# Patient Record
Sex: FEMALE | URBAN - METROPOLITAN AREA
[De-identification: names, ages, dates, MRNs, and addresses within clinical notes are randomized per-mention and may not be internally consistent; named-entity substitution may affect disease eponyms.]

---

## 2021-08-24 ENCOUNTER — HOSPITAL ENCOUNTER (EMERGENCY)
Age: 51
Discharge: ANOTHER HOSPITAL | End: 2021-08-24
Payer: COMMERCIAL

## 2021-08-24 ENCOUNTER — APPOINTMENT (OUTPATIENT)
Dept: CT IMAGING | Age: 51
End: 2021-08-24

## 2021-08-24 ENCOUNTER — HOSPITAL ENCOUNTER (OUTPATIENT)
Age: 51
Setting detail: SPECIMEN
Discharge: HOME | End: 2021-08-24

## 2021-08-24 PROCEDURE — 82565 ASSAY OF CREATININE: CPT

## 2021-08-24 PROCEDURE — 70450 CT HEAD/BRAIN W/O DYE: CPT

## 2021-08-24 PROCEDURE — 2580000003 HC RX 258: Performed by: STUDENT IN AN ORGANIZED HEALTH CARE EDUCATION/TRAINING PROGRAM

## 2021-08-24 PROCEDURE — 99203 OFFICE O/P NEW LOW 30 MIN: CPT | Performed by: PSYCHIATRY & NEUROLOGY

## 2021-08-24 RX ORDER — 0.9 % SODIUM CHLORIDE 0.9 %
INTRAVENOUS SOLUTION INTRAVENOUS CONTINUOUS PRN
Status: COMPLETED | OUTPATIENT
Start: 2021-08-24 | End: 2021-08-24

## 2021-08-24 RX ADMIN — SODIUM CHLORIDE 500 ML: 0.9 INJECTION, SOLUTION INTRAVENOUS at 14:35

## 2021-08-24 NOTE — ED NOTES
..    8/24/2021 3:54 PM    Patient: Nilo Mckinney, 46 y.o., female Race: Caucasion  Patient Address: No address on file. Incident Address:  [place] [address] [address]     [x] [facility]-  [] [facility]  [] [place]   [] [place]  [] [facility]  Patient Phone #: There are no phone numbers on file. Insurance: Payor: /   Worker's Comp:  []  Yes   [x]  No  Emergency Contact: No emergency contact information on file. MRN: [id number]  [facility]# [id number]  YOB: 1970  Primary Care Physician: No primary care provider on file. Advance Directive: [x] Full Code   []DNR-CC   []DNR-CCA    MSU Crew: BAYRON Pinto - CT Tech, GERALDINE Hernandez - Paramedic, Xiang Montalvo - RN    Pt Transported To:  [] [place] [address]  [] [facility]  [] [facility]  [] [facility]  [x] [facility]  [] [facility]  [facility] [] [facility] [] [facility]    Was patient transported to closest facility? []Yes  [x]No (if No specify reason)  Closest facility on [address]   []   Patient/family request    []   Divert to specialist       Response Code   [] 2  [x] 3  []   Change  [] 2  [] 3   Transport Code   [x] 2  [] 3  []   Change  [] 2  [] 3     Mileage:  [address] [facility]   Total Miles 6.9     Call Received 8/24/2021 1338   Dispatched 8/24/2021 1340   Enroute 8/24/2021 1344   Arrived Scene 8/24/2021 1358   At Patient 8/24/2021 1401   Decision to Scan 8/24/2021 1405   Scan 8/24/2021 1412   Departed Scene 8/24/2021 [facility] 8/24/2021 [address] 8/24/2021 1510   In Service 8/24/2021 1510         Allergies  [] Updated/Reviewed by MSU  [x] Historical Data Only  [] Unavailable  has no allergies on file. Medications  [] Updated/Reviewed by MSU  [x] Historical Data Only  [] Unavailable  Prior to Admission medications    Not on File      Past Medical History  [] Updated/Reviewed by MSU  [x] Historical Data Only  [] Unavailable   has no past medical history on file.     Patient Weight: 145 lbs   [x]   Estimated   []   Stated          VITALS Time BP Pulse   Resp  Rate N-normal  S-shallow  D-deep Monitor SpO2 O2    LPM BGL   Temp Pain   1410 127/82 102 28 N ST 99%      1425 131/86 109 21 N ST 94% RA      1445 113/76 104 19 N ST 96% RA                                   Pupils GCS   Time R L E  4 V  5 M  6 T  15   1410 3 3 4 5 6 15   1425 3 3 4 5 6 15   1445 3 3 4 5 6 15                         [facility] -   record on all transports and Q15 min after tPA administration    NIHSS       Time 1405      1a. LOC - Arousal Status 0      1b. LOC - Questions 0      1c. LOC - Commands 0      2. Eye Movements (gaze) 0      3. Visual Fields 0      4. Facial Palsy 0      5a. Motor Left Arm 0      5b. Motor Right Arm 0      6a. Motor Left Leg 0      6b. Motor Right Leg 0      7. Limb Ataxia 0      8. Sensory 1      9. Language/Aphasia 0      10. Dysarthria 1      11. Neglect 0      TOTAL 2          Research:    RACE Score: Zero Time: 4495  StrokeVAN Score: Neg Time: 5113    Time Last Known Well:  8859 8/24/2021    Narrative:    Dispatched to possible CVA per EMS. Arrived to find Osmany Guerrero, 46 y.o., female c/o right sided numbness and tingling, incomprehensible speech and shaking after chemo. Report received from [address] [place] [address] and nursing staff at patients side. Pt is being treated for sigmoid color cancer and was at the [facility] today receiving her 2nd dose of chemotherapy medication Oxaliplatin. After her treatment was complete and was leaving the center she reports feeling dizzy with right sided severe numbness and tingling and her right hand cramping up. Per the [place] center nurse, pt's speech was slurred and incomprehensible but quickly returned to normal. EMS was called and MSU notifed. Upon MSU arrival, pt found sitting in treatment area with IV port accessed and IV fluid infusing. Pt noted to be awake alert and oriented. Pt states she feels like she has pins and needles to her entire right side.  Her voice is shaky and her body is trembling. No facial droop noted, no arm drift noted, no left drift noted. No gaze preference. Pt denies HA, blurry vision,nausea or vomiting. Decision made to scan. Upon standing pt reports severe pricking pain to right leg and arm when being moved. Pt had difficulty walking self to cot and required 2 assist. After CT scan pt's symptoms began to improve. Pt more relaxed and body trembling ceased. Pt reports numbness and tingling is improving. Dr. Liudmila Mistry at patient side via telemedicine unit. Reviewed tPA administration and its uses with risks and benefits with pt. At this time pt feels she does not want tPA for symptoms. Dr Liudmila Mistry agreeable with decision. Pt being transported to [facility] due to [facility] on EMS bypass. Patient received the following medications prior to MSU arrival: NONE  Patient has the following lines prior to MSU arrival: Gripper needle to port-right chest  Patient has the following airway placed prior to MSU arrival: NONE    Patient was transferred to cot via 2 person assist and secured with straps x3. Zoll ECG, SpO2, and NIBP applied and monitored throughout encounter. HOB maintained at 30 degrees. Patient was transferred to ambulance, cot secured in scan position. Non contrast CT scan performed. After scan cot secured in transport position. IV tPA inclusion/exclusion criteria reviewed with patient and physician. Candidate for IV tPA therapy? [] Yes   [x] No - due to the following exclusion criteria:    [] ICH   [] Taking anticoagulant -   [] Beyond last time known well window  [] At or returned to baseline   [x] Marked improvement of symptoms  [x] No disabling symptoms  [] Other -     Patient is being transported to [facility] .   During transport patient rests comfortably. Cabin temp maintained at 70-72 °F throughout transport. Patient was transferred to bed 29 via 4 person sheet lift. Patient care handoff completed with RN at bedside.  CT findings verbally relayed TISSUES/SKULL:  No acute abnormality of the visualized skull or soft tissues. 1. Apparent linear hyperattenuation along the left sylvian fissure and left circular sulcus within the location of the left distal M1/proximal M2 and superior division M2 branchs, respectively. Findings raise the possibility of underlying intraluminal thrombus. No prior head CTs were available at the time of dictation for comparison. 2. In addition, there is questionable blurring of the left insular cortex and left temporal operculum. The above findings along with patient's symptomatology raises the concern for left MCA infarct. 3. No evidence of acute parenchymal hemorrhage or significant mass effect. 4. Mucosal lesion within the left maxillary sinus with differential considerations including an antrochoanal polyp, mucocele or inverted papilloma. Critical results were called by Dr. Mabelene Fleischer, DO to Dr. Polly Chicas on 8/24/2021 at 14:35. Physical assessment performed:    Neuro: Rt sided face arm and leg numbness and tingling, thick speech- resolved, body trembles-resolved,    Resp: lungs clear to auscultation bilaterally, normal effort  Cardiac: regular rate, no murmur, 12 lead ECG shows NSR  Muscular/skeletal/peripheral vascular: no edema, no redness or tenderness in the calves, pulses present in 4 extremities   Abd/GI/: abd flat, soft, non tender   Skin: normal color, warm, dry      IV LINES   Time Size Site # Attempts Performed By   1430 18g RT AC 1 GERALDINE Hernandez EMT-P                     Total Amount of IV Fluids Infused: 100ml    MEDS / ELECTRICAL RX   Time Therapy Dosage Route Response Performed By                                                       TPA Administration    Time Bolus Dose Infusion Dose Waste   NA      NA          [] tPA dosing double checked by:         ACUTE STROKE DYSPHAGIA SCREEN              YES NO   1 GCS less than 13?         2 Facial Asymmetry or Weakness? 3 Tongue Asymmetry or Weakness?        4 Palatal Asymmetry or Weakness? 5 Signs of aspiration during 3oz water test?*                 If answers to questions 1-4 are NO proceed to 3oz water test               *Administer 3oz of water for sequential drinks, note any throat clearing, cough, or change in vocal quality immediately after and 1 minute following the swallow.                If answers to questions 1-5 are NO proceed with ordered PO meds       POC LABS      TIME 1429     Test (reference range)      FSBG ()      PT (11-13.5)      INR (0.8-1.1)      Na (138-146)      K (3.5-4.9)      Cl ()      iCa (1.2-1.32)      TCO2 (24-29)      Glu ()      BUN (8.0-26)      Crea (0.6-1.3) 0.7     Hct (38-51)      Hb (12.0-17)      AnGap 10.0-20)                Assistance:   []    Fire -     []    Police    [x]    Other EMS (See Below)    LS # 5      Hospital Notification:    Med [facility] -  [] [facility]  [] [facility]  [x] [facility]  [] [facility]  [] [facility] [] [facility] [] [facility] [] [facility]  [] [employer]     [x]    Med Channel:     []    Cell Phone     Med Control Orders Received:   [x] No  []  Yes:     Med Control Physician (if on line medical direction received)  -        Hospital Team Alert:   []    Trauma Alert    []    STEMI Alert         []    Stroke Alert    []    RACE Alert      Description Of Valuables: none    Patient Valuables:   []    With Patient    []    Not Received    []    ER / Lab     Call Outcome:   [x]    Transport to Facility    []    Care Transferred to [facility]    []    Cancelled    []    Patient Refusal    []                           Mobile Stroke Unit    Electronically signed by Dena Tuttle RN on 8/24/21 at 3:54 PM EDT     Dena Tuttle RN  08/24/21 5985

## 2021-08-24 NOTE — VIRTUAL HEALTH
[address] [facility],4Th Floor Stroke and Vascular Neurology Consult for  [facility] Stroke Unit Stroke Alert through [address] @ 215pm 8/24/2021 8/24/2021 5:59 PM    Pt Name: Rm Silva  MRN: [id number]  YOB: 1970  Date of evaluation: 8/24/2021  Primary Care Physician: No primary care provider on file. Reason for Evaluation: Stroke Evaluation with Discussion with Ed or primary team with Telemedicine and stroke evaluation with Review of imaging and labs    54yo female with limited pmh including current dx of colon cancer, on chemotherapy. Pt was given her second dose of oxiplatin, and had R koko numbness and paresthesias, dizziness, speech difficulty. At baseline pt has no focal deficits. lkn 1pm 8/24/2021. Pt had her second dose of oxiplatin chemotherapy earlier today. Pt had acute R koko numbness and paresthesias, dizziness, speech difficulty. On EMS arrival pt's speech is back to normal, some residual r koko numbness and paresthesia. sbp 129, glu 113. Allergies  has no allergies on file. Medications  Prior to Admission medications    Not on File    Scheduled Meds:  Continuous Infusions:  PRN Meds:.  Past Medical History   has no past medical history on file.   Social History  Social History     Socioeconomic History    Marital status: Not on file     Spouse name: Not on file    Number of children: Not on file    Years of education: Not on file    Highest education level: Not on file   Occupational History    Not on file   Tobacco Use    Smoking status: Not on file   Substance and Sexual Activity    Alcohol use: Not on file    Drug use: Not on file    Sexual activity: Not on file   Other Topics Concern    Not on file   Social History Narrative    Not on file     Social Determinants of Health     Financial Resource Strain:     Difficulty of Paying Living Expenses:    Food Insecurity:     Worried About Running Out of Food in the Last Year:     [address] in the Last Year:    Transportation Needs:     Lack of Transportation (Medical):  Lack of Transportation (Non-Medical):    Physical Activity:     Days of Exercise per Week:     Minutes of Exercise per Session:    Stress:     Feeling of Stress :    Social Connections:     Frequency of Communication with Friends and Family:     Frequency of Social Gatherings with Friends and Family:     Attends Latter day Services:     Active Member of Clubs or Organizations:     Attends Club or Organization Meetings:     Marital Status:    Intimate Partner Violence:     Fear of Current or Ex-Partner:     Emotionally Abused:     Physically Abused:     Sexually Abused:      Family History  No family history on file. OBJECTIVE  There were no vitals filed for this visit. General:  Gen: normal habitus, NAD  HEENT: NCAT, mucosa moist  Cvs: RRR, S1 S2 normal  Resp: symmetric unlabored breathing  Abd: s/nd/nt  Ext: no edema  Skin: no lesions seen, warm and dry    Neuro:  Gen: awake and alert, oriented x3. Lang/speech: no aphasia or dysarthria. Follows commands. CN: PERRL, EOMI, VFF, V1-3 intact, face symmetric, hearing intact, shoulder shrug symmetric, tongue midline  Motor: grossly 5/5 UE and LE b/l  Sense: LT R hemibody with paresthesia. Coord: FTN and HTS intact b/l  DTR: deferred  Gait: deferred    NIH Stroke Scale:   1a  Level of consciousness: 0 - alert; keenly responsive   1b. LOC questions:  0 - answers both questions correctly   1c. LOC commands: 0 - performs both tasks correctly   2. Best Gaze: 0 - normal   3. Visual: 0 - no visual loss   4. Facial Palsy: 0 - normal symmetric movement   5a. Motor left arm: 0 - no drift, limb holds 90 (or 45) degrees for full 10 seconds   5b. Motor right arm: 0 - no drift, limb holds 90 (or 45) degrees for full 10 seconds   6a.  Motor left le - no drift; leg holds 30 degree position for full 5 seconds   6b  Motor right le - no drift; leg holds 30 degree position for full 5 seconds   7. Limb Ataxia: 0 - absent   8. Sensory: 1 - mild to moderate sensory loss; patient feels pinprick is less sharp or is dull on the affected side; there is a loss of superficial pain with pinprick but patient is aware of being touched    9. Best Language:  0 - no aphasia, normal   10. Dysarthria: 0 - normal   11. Extinction and Inattention: 0 - no abnormality         Total:   1     Premorbid MRS: 0      Imaging:  Images were personally reviewed with ANA CRISTINA. AI used to review images including:  CT brain without contrast: no large territory hypodensity or bleed    Assessment  54yo female with limited pmh including current dx of colon cancer, on chemotherapy. Pt was given her second dose of oxiplatin, and had R koko numbness and paresthesias, dizziness, speech difficulty. Symptoms rapidly improving. Ct head no bleed or large hypodensity. Per radiology there is possible L insular and opercular grey/white junction blurring and L MCA hyperdense. ddx includes acute stroke, side effect from the medication, focal sz. Recommendations:  --tpa offered but declined. Mild symptoms. --transfer to [facility] ED. On arrival stat cta head and neck w and wo con. If there is lvo consider mt. --bolus 1L ns ivf then continue 100cc/hr  --mri brain w and wo con, additional care and workup as per neuro. Discussed with Stroke team    At least 30 min of critical care time spent through telemedicine robot at patient bedside (via interactive/real-time software) as patient is in imminent and life threatening deterioration with further treatment and evaluation. This Virtual Visit was conducted with patient's (and/or legal guardian's) consent, to provide telestroke consultation and necessary medical care.   Time spent examining patient, reviewing the images personally, reviewing the chart, perform high complexity decision making and speaking with the nursing staff regarding recommendations      Dawood Croft MD, PhD  Stroke, Neurocritical Care And/or [address] [facility]  [address] [place]  Electronically signed 8/24/2021 at 5:59 PM

## 2021-08-25 LAB — POC CREATININE: 0.7 MG/DL (ref 0.6–1.4)

## 2022-02-24 NOTE — VIRTUAL HEALTH
[address] [facility],4Th Floor Stroke and Vascular Neurology Consult for  [facility] Stroke Unit Stroke Alert through [address] @ 215pm 8/24/2021 2/24/2022 12:36 PM    Pt Name: Wilils Mcnamara  MRN: [id number]  [place]: 1970  Date of evaluation: 2/24/2022  Primary Care Physician: Mu Medina March, DO  Reason for Evaluation: Stroke Evaluation with Discussion with Ed or primary team with Telemedicine and stroke evaluation with Review of imaging and labs    52yo female with limited pmh including current dx of colon cancer, on chemotherapy. Pt was given her second dose of oxiplatin, and had R koko numbness and paresthesias, dizziness, speech difficulty. At baseline pt has no focal deficits. lkn 1pm 8/24/2021. Pt had her second dose of oxiplatin chemotherapy earlier today. Pt had acute R koko numbness and paresthesias, dizziness, speech difficulty. On EMS arrival pt's speech is back to normal, some residual r koko numbness and paresthesia. sbp 129, glu 113. Allergies  has no allergies on file. Medications  Prior to Admission medications    Not on File    Scheduled Meds:  Continuous Infusions:  PRN Meds:.  Past Medical History   has no past medical history on file.   Social History  Social History     Socioeconomic History    Marital status: Unknown     Spouse name: Not on file    Number of children: Not on file    Years of education: Not on file    Highest education level: Not on file   Occupational History    Not on file   Tobacco Use    Smoking status: Not on file    Smokeless tobacco: Not on file   Substance and Sexual Activity    Alcohol use: Not on file    Drug use: Not on file    Sexual activity: Not on file   Other Topics Concern    Not on file   Social History Narrative    Not on file     Social Determinants of Health     Financial Resource Strain:     Difficulty of Paying Living Expenses: Not on file   Food Insecurity:     Worried About [address] in the Last Year: Not on file    Ran Out of Food in the Last Year: Not on file   Transportation Needs:     Lack of Transportation (Medical): Not on file    Lack of Transportation (Non-Medical): Not on file   Physical Activity:     Days of Exercise per Week: Not on file    Minutes of Exercise per Session: Not on file   Stress:     Feeling of Stress : Not on file   Social Connections:     Frequency of Communication with Friends and Family: Not on file    Frequency of Social Gatherings with Friends and Family: Not on file    Attends [employer] Services: Not on file    Active Member of [address] [place] [address] [employer] or Organizations: Not on file    Attends Club or Organization Meetings: Not on file    Marital Status: Not on file   Intimate Partner Violence:     Fear of Current or Ex-Partner: Not on file    Emotionally Abused: Not on file    Physically Abused: Not on file    Sexually Abused: Not on file   Housing Stability:     Unable to Pay for Housing in the Last Year: Not on file    Number of [place] in the Last Year: Not on file    Unstable Housing in the Last Year: Not on file     Family History  No family history on file. OBJECTIVE  There were no vitals filed for this visit. General:  Gen: normal habitus, NAD  HEENT: NCAT, mucosa moist  Cvs: RRR, S1 S2 normal  Resp: symmetric unlabored breathing  Abd: s/nd/nt  Ext: no edema  Skin: no lesions seen, warm and dry    Neuro:  Gen: awake and alert, oriented x3. Lang/speech: no aphasia or dysarthria. Follows commands. CN: PERRL, EOMI, VFF, V1-3 intact, face symmetric, hearing intact, shoulder shrug symmetric, tongue midline  Motor: grossly 5/5 UE and LE b/l  Sense: LT R hemibody with paresthesia. Coord: FTN and HTS intact b/l  DTR: deferred  Gait: deferred    NIH Stroke Scale:   1a  Level of consciousness: 0 - alert; keenly responsive   1b. LOC questions:  0 - answers both questions correctly   1c. LOC commands: 0 - performs both tasks correctly   2.   Best Gaze: 0 - normal 3. Visual: 0 - no visual loss   4. Facial Palsy: 0 - normal symmetric movement   5a. Motor left arm: 0 - no drift, limb holds 90 (or 45) degrees for full 10 seconds   5b. Motor right arm: 0 - no drift, limb holds 90 (or 45) degrees for full 10 seconds   6a. Motor left le - no drift; leg holds 30 degree position for full 5 seconds   6b  Motor right le - no drift; leg holds 30 degree position for full 5 seconds   7. Limb Ataxia: 0 - absent   8. Sensory: 1 - mild to moderate sensory loss; patient feels pinprick is less sharp or is dull on the affected side; there is a loss of superficial pain with pinprick but patient is aware of being touched    9. Best Language:  0 - no aphasia, normal   10. Dysarthria: 0 - normal   11. Extinction and Inattention: 0 - no abnormality         Total:   1     Premorbid MRS: 0      Imaging:  Images were personally reviewed with ANA CRISTINA. AI used to review images including:  CT brain without contrast: no large territory hypodensity or bleed    Assessment  54yo female with limited pmh including current dx of colon cancer, on chemotherapy. Pt was given her second dose of oxiplatin, and had R koko numbness and paresthesias, dizziness, speech difficulty. Symptoms rapidly improving. Ct head no bleed or large hypodensity. Per radiology there is possible L insular and opercular grey/white junction blurring and L MCA hyperdense. ddx includes acute stroke, side effect from the medication, focal sz. Recommendations:  --tpa offered but declined. Mild symptoms. --transfer to [facility] ED. On arrival stat cta head and neck w and wo con. If there is lvo consider mt. --bolus 1L ns ivf then continue 100cc/hr  --mri brain w and wo con, additional care and workup as per neuro.     Discussed with Stroke team    At least 30 min of critical care time spent through telemedicine robot at patient bedside (via interactive/real-time software) as patient is in imminent and life threatening deterioration with further treatment and evaluation. This Virtual Visit was conducted with patient's (and/or legal guardian's) consent, to provide telestroke consultation and necessary medical care.   Time spent examining patient, reviewing the images personally, reviewing the chart, perform high complexity decision making and speaking with the nursing staff regarding recommendations      Luz Marina Resendez MD, PhD  Stroke, Neurocritical Care And/or [address] [place] [address] Stroke [address] [place]  Electronically signed 2/24/2022 at 12:36 PM

## 2022-02-24 NOTE — ED NOTES
..    2/24/2022 12:36 PM    Patient: Christiano Mahoney, 46 y.o., female Race: [facility]  Patient Address: [address] [place] [address]  Incident Address:  [place] [address] [address]     [x] [facility] PASSMadison-Gilbert-  [] [facility]  [] [facility]   [] KATE AG JR. [facility]  [] [facility]  Patient Phone #: [phone]   Insurance: Payor: Flynn King /  /  /   Rob Ulices:  []  Yes   [x]  No  Emergency Contact: Extended Emergency Contact Information  Primary Emergency ContactIgor Hull  Mobile Phone: [phone]  Relation: Spouse  MRN: [id number]  [facility]# [id number]  YOB: 1970  Primary Care Physician: Maria Elena Mckeon March, DO  Advance Directive: [x] Full Code   []DNR-CC   []DNR-CCA    MSU Crew: BAYRON Culver - CT TechGERALDINE - Paramedic, Denver Rucks - TAMELA    Pt Transported To:  [] [facility]  [] [facility]  [] [facility]  [] [facility]  [x] [facility]  [] [facility]  [facility] [] [facility] [] [facility]    Was patient transported to closest facility? []Yes  [x]No (if No specify reason)  Closest facility on [address]   []   Patient/family request    []   Divert to specialist       Response Code   [] 2  [x] 3  []   Change  [] 2  [] 3   Transport Code   [x] 2  [] 3  []   Change  [] 2  [] 3     Mileage:  [address] [facility]   Total Miles 6.9     Call Received 2/24/2022 1338   Dispatched 2/24/2022 1340   Enroute 2/24/2022 1344   Arrived Scene 2/24/2022 1358   At Patient 2/24/2022 1401   Decision to Scan 2/24/2022 1405   Scan 2/24/2022 1412   Departed Scene 2/24/2022 [address] [facility] 2/24/2022 [address] 2/24/2022 1510   In Service 2/24/2022 1510         Allergies  [] Updated/Reviewed by MSU  [x] Historical Data Only  [] Unavailable  has no allergies on file.   Medications  [] Updated/Reviewed by MSU  [x] Historical Data Only  [] Unavailable  Prior to Admission medications    Not on File      Past Medical History  [] Updated/Reviewed by MSU  [x] Historical Data Only  [] Unavailable   has no past medical history on file.    Patient Weight: 145 lbs   [x]   Estimated   []   Stated          VITALS          Time BP Pulse   Resp  Rate N-normal  S-shallow  D-deep Monitor SpO2 O2    LPM BGL   Temp Pain   1410 127/82 102 28 N ST 99%      1425 131/86 109 21 N ST 94% RA      1445 113/76 104 19 N ST 96% RA                                   Pupils GCS   Time R L E  4 V  5 M  6 T  15   1410 3 3 4 5 6 15   1425 3 3 4 5 6 15   1445 3 3 4 5 6 15                         [facility] -   record on all transports and Q15 min after tPA administration    NIHSS       Time 1405      1a. LOC - Arousal Status 0      1b. LOC - Questions 0      1c. LOC - Commands 0      2. Eye Movements (gaze) 0      3. Visual Fields 0      4. Facial Palsy 0      5a. Motor Left Arm 0      5b. Motor Right Arm 0      6a. Motor Left Leg 0      6b. Motor Right Leg 0      7. Limb Ataxia 0      8. Sensory 1      9. Language/Aphasia 0      10. Dysarthria 1      11. Neglect 0      TOTAL 2          Research:    RACE Score: Zero Time: 0064  StrokeVAN Score: Neg Time: 6327    Time Last Known Well:  9425 8/24/2021    Narrative:    Dispatched to possible [facility] per [facility]. Arrived to find Ann-Marie Nelson, 46 y.o., female c/o right sided numbness and tingling, incomprehensible speech and shaking after chemo. Report received from [address] [place] [address] and nursing staff at patients side. Pt is being treated for sigmoid color cancer and was at the [facility] today receiving her 2nd dose of chemotherapy medication Oxaliplatin. After her treatment was complete and was leaving the center she reports feeling dizzy with right sided severe numbness and tingling and her right hand cramping up. Per the [facility] center nurse, pt's speech was slurred and incomprehensible but quickly returned to normal. EMS was called and MSU notifed. Upon MSU arrival, pt found sitting in treatment area with IV port accessed and IV fluid infusing. Pt noted to be awake alert and oriented.  Pt states she feels like she has pins and needles to her entire right side. Her voice is shaky and her body is trembling. No facial droop noted, no arm drift noted, no left drift noted. No gaze preference. Pt denies HA, blurry vision,nausea or vomiting. Decision made to scan. Upon standing pt reports severe pricking pain to right leg and arm when being moved. Pt had difficulty walking self to cot and required 2 assist. After CT scan pt's symptoms began to improve. Pt more relaxed and body trembling ceased. Pt reports numbness and tingling is improving. Dr. Niko Luna at patient side via telemedicine unit. Reviewed tPA administration and its uses with risks and benefits with pt. At this time pt feels she does not want tPA for symptoms. Dr Niko Luna agreeable with decision. Pt being transported to [facility] due to [facility] on EMS bypass. Patient received the following medications prior to MSU arrival: NONE  Patient has the following lines prior to MSU arrival: Gripper needle to port-right chest  Patient has the following airway placed prior to MSU arrival: NONE    Patient was transferred to cot via 2 person assist and secured with straps x3. Zoll ECG, SpO2, and NIBP applied and monitored throughout encounter. HOB maintained at 30 degrees. Patient was transferred to ambulance, cot secured in scan position. Non contrast CT scan performed. After scan cot secured in transport position. IV tPA inclusion/exclusion criteria reviewed with patient and physician. Candidate for IV tPA therapy? [] Yes   [x] No - due to the following exclusion criteria:    [] ICH   [] Taking anticoagulant -   [] Beyond last time known well window  [] At or returned to baseline   [x] Marked improvement of symptoms  [x] No disabling symptoms  [] Other -     Patient is being transported to [facility] .   During transport patient rests comfortably. Cabin temp maintained at 70-72 °F throughout transport.      Patient was transferred to bed 29 via 4 person sheet lift. Patient care handoff completed with RN at bedside. CT findings verbally relayed to RN. CT disc taken to radiology room by Trista Radford. Imaging:  Images were obtained onboard the MSU including:  [x] CT brain without contrast - see results below:      CT HEAD WO CONTRAST    Result Date: 8/24/2021  EXAMINATION: CT OF THE HEAD WITHOUT CONTRAST  8/24/2021 2:08 pm TECHNIQUE: CT of the head was performed without the administration of intravenous contrast. Dose modulation, iterative reconstruction, and/or weight based adjustment of the mA/kV was utilized to reduce the radiation dose to as low as reasonably achievable. COMPARISON: None. HISTORY: ORDERING SYSTEM PROVIDED HISTORY: Stroke TECHNOLOGIST PROVIDED HISTORY: Stroke Decision Support Exception - unselect if not a suspected or confirmed emergency medical condition->Emergency Medical Condition (MA) Is the patient pregnant?->No FINDINGS: BRAIN/VENTRICLES: Questionable blurring of the left insular cortex and left temporal operculum, although the exam is slightly degraded due to beam hardening artifact and portable technique. There is also apparent linear hyperattenuation along the left sylvian fissure within the distribution of the distal M1/proximal M2 segment raising the possibility of underlying intraluminal thrombus with additional punctate hyperdensity along the left circular sulcus in the location of the superior division left M2 branch. There is no acute intracranial hemorrhage, mass effect or midline shift. No abnormal extra-axial fluid collection. There is no evidence of hydrocephalus. ORBITS: The visualized portion of the orbits demonstrate no acute abnormality. SINUSES: There is complete opacification of the left maxillary sinus with bulging of the right maxillary sinus wall with protrusion into the left nasal cavity. No adjacent aggressive osseous changes. Preserved left retro maxillary fat pad.   Findings raise the possibility of underlying mucosal Facial Asymmetry or Weakness? 3 Tongue Asymmetry or Weakness? 4 Palatal Asymmetry or Weakness? 5 Signs of aspiration during 3oz water test?*                 If answers to questions 1-4 are NO proceed to 3oz water test               *Administer 3oz of water for sequential drinks, note any throat clearing, cough, or change in vocal quality immediately after and 1 minute following the swallow.                If answers to questions 1-5 are NO proceed with ordered PO meds       POC LABS      TIME 1429     Test (reference range)      FSBG ()      PT (11-13.5)      INR (0.8-1.1)      Na (138-146)      K (3.5-4.9)      Cl ()      iCa (1.2-1.32)      TCO2 (24-29)      Glu ()      BUN (8.0-26)      Crea (0.6-1.3) 0.7     Hct (38-51)      Hb (12.0-17)      AnGap 10.0-20)                Assistance:   []    Fire -     []    Police    [x]    Other EMS (See Below)    LS # 5      Hospital Notification:    [address] -  [] Ab[facility] Mcbride [address]  [] [facility]  [x] [facility]  [] [facility]  [] [facility] [] [facility] [] [facility] [] [facility]  [] [facility]     [x]    Med Channel:     []    Cell Phone     Med Control Orders Received:   [x] No  []  Yes:     Med Control Physician (if on line medical direction received)  -        Hospital Team Alert:   []    Trauma Alert    []    STEMI Alert         []    Stroke Alert    []    RACE Alert      Description Of Valuables: none    Patient Valuables:   []    With Patient    []    Not Received    []    ER / Lab     Call Outcome:   [x]    Transport to Facility    []    Care Transferred to [facility]    []    Cancelled    []    Patient Refusal    []                           Mobile Stroke Unit    Electronically signed by Deloris Pittman RN on 8/24/21 at 3:54 PM EDT     Deloris Pittman RN  08/24/21 4352